# Patient Record
Sex: FEMALE | Race: WHITE | HISPANIC OR LATINO | Employment: UNEMPLOYED | URBAN - METROPOLITAN AREA
[De-identification: names, ages, dates, MRNs, and addresses within clinical notes are randomized per-mention and may not be internally consistent; named-entity substitution may affect disease eponyms.]

---

## 2017-02-09 ENCOUNTER — GENERIC CONVERSION - ENCOUNTER (OUTPATIENT)
Dept: OTHER | Facility: OTHER | Age: 32
End: 2017-02-09

## 2017-03-31 ENCOUNTER — ALLSCRIPTS OFFICE VISIT (OUTPATIENT)
Dept: OTHER | Facility: OTHER | Age: 32
End: 2017-03-31

## 2017-06-15 ENCOUNTER — ALLSCRIPTS OFFICE VISIT (OUTPATIENT)
Dept: OTHER | Facility: OTHER | Age: 32
End: 2017-06-15

## 2017-06-15 DIAGNOSIS — E61.1 IRON DEFICIENCY: ICD-10-CM

## 2017-06-16 ENCOUNTER — LAB CONVERSION - ENCOUNTER (OUTPATIENT)
Dept: OTHER | Facility: OTHER | Age: 32
End: 2017-06-16

## 2017-06-16 LAB
BASOPHILS # BLD AUTO: 0.5 %
BASOPHILS # BLD AUTO: 29 CELLS/UL (ref 0–200)
DEPRECATED RDW RBC AUTO: 14.9 % (ref 11–15)
EOSINOPHIL # BLD AUTO: 1 %
EOSINOPHIL # BLD AUTO: 58 CELLS/UL (ref 15–500)
FERRITIN SERPL-MCNC: 12 NG/ML (ref 10–154)
HBA1C MFR BLD HPLC: 4.8 % OF TOTAL HGB
HCT VFR BLD AUTO: 40.6 % (ref 35–45)
HGB BLD-MCNC: 13.3 G/DL (ref 11.7–15.5)
IRON SATN MFR SERPL: 32 % (CALC) (ref 11–50)
IRON SERPL-MCNC: 109 MCG/DL (ref 40–190)
LYMPHOCYTES # BLD AUTO: 1897 CELLS/UL (ref 850–3900)
LYMPHOCYTES # BLD AUTO: 32.7 %
MCH RBC QN AUTO: 26 PG (ref 27–33)
MCHC RBC AUTO-ENTMCNC: 32.8 G/DL (ref 32–36)
MCV RBC AUTO: 79.1 FL (ref 80–100)
MONOCYTES # BLD AUTO: 383 CELLS/UL (ref 200–950)
MONOCYTES (HISTORICAL): 6.6 %
NEUTROPHILS # BLD AUTO: 3434 CELLS/UL (ref 1500–7800)
NEUTROPHILS # BLD AUTO: 59.2 %
PLATELET # BLD AUTO: 208 THOUSAND/UL (ref 140–400)
PMV BLD AUTO: 8.1 FL (ref 7.5–12.5)
RBC # BLD AUTO: 5.13 MILLION/UL (ref 3.8–5.1)
TIBC SERPL-MCNC: 344 MCG/DL (CALC) (ref 250–450)
WBC # BLD AUTO: 5.8 THOUSAND/UL (ref 3.8–10.8)

## 2017-06-22 ENCOUNTER — GENERIC CONVERSION - ENCOUNTER (OUTPATIENT)
Dept: OTHER | Facility: OTHER | Age: 32
End: 2017-06-22

## 2017-06-23 ENCOUNTER — GENERIC CONVERSION - ENCOUNTER (OUTPATIENT)
Dept: OTHER | Facility: OTHER | Age: 32
End: 2017-06-23

## 2017-08-28 ENCOUNTER — ALLSCRIPTS OFFICE VISIT (OUTPATIENT)
Dept: OTHER | Facility: OTHER | Age: 32
End: 2017-08-28

## 2017-08-28 DIAGNOSIS — R79.0 ABNORMAL LEVEL OF BLOOD MINERAL: ICD-10-CM

## 2018-01-10 NOTE — RESULT NOTES
Dear Shelly Meraz,   Your test results have returned and are listed below:      Plan  Health Maintenance, Iron deficiency, Postgastrectomy malabsorption,  S/P laparoscopic sleeve gastrectomy    · (1) CBC/ PLT (NO DIFF); Status:Active; Requested for:[id number];    · (1) FERRITIN; Status:Active; Requested for:[id number];    · (1) IRON SATURATION %, TIBC; Status:Active; Requested  for:[id number];    · (1) IRON; Status:Active; Requested for:[id number];     Discussion/Summary  Your results show some abnormalities  2/17/16 labs reviewed  Your vitamin D is 34 which is low  Both vitamin D and calcium are important for bone health  You need to add up the amount of vitamin D that is in your multivitamin/mineral (s) and what is in your calcium citrate with D together  The total should add up to between 6934-9001 IU of vitamin D per day  You may need to add additional vitamin D3-this can be found over -the-counter  Your total calcium with all your supplements added together should equal  4878-0400 mg per day (what is in your mulitivitamin/mineral(s) and what is in your calcium citrate with D   Calcium is best absorbed in divided doses of 500 mg each  Also it is best spaced 2 hours apart from any iron  Iron and calcium taken at the same time âfightâ to be absorbed, so do not take them at the same time  Your iron stores (ferritin) is low at 7  Low iron can lead to anemia and fatigue  You should take a high potency iron supplement (like Vitron C) -65 mg -this is available over-the-counter  Start by taking one per day for 2 weeks, then increase to twice a day and then 2 more weeks try to increase to 3 per day  Stay on the highest dose you can tolerate  Iron can be very constipating so also take MIRALAX every day OR a daily stool softener  Your levels should be rechecked in 3 months  IF you CANNOT tolerate the oral iron, please let me know       Hemoglobin A1 c is 5%-this indicates good blood sugar control over 3 months and is GOOD  Please keep your regularly scheduled follow-up appointment  If you do not have a follow-up scheduled, please call the office to schedule a follow-up visit  If you have any questions, please don't hesitate to call the office  Sincerely,      Signatures   Electronically signed by : BERTHA Concepcion;  Apr 21 2016  5:20PM EST                       (Author)    Electronically signed by : JUAN MANUEL Handley ; Apr 22 2016 12:50PM EST                       (Validation)

## 2018-01-13 VITALS
WEIGHT: 170.38 LBS | DIASTOLIC BLOOD PRESSURE: 60 MMHG | HEIGHT: 67 IN | SYSTOLIC BLOOD PRESSURE: 116 MMHG | BODY MASS INDEX: 26.74 KG/M2 | TEMPERATURE: 97.7 F

## 2018-01-13 VITALS
BODY MASS INDEX: 26.13 KG/M2 | HEIGHT: 67 IN | TEMPERATURE: 98.2 F | WEIGHT: 166.5 LBS | DIASTOLIC BLOOD PRESSURE: 70 MMHG | OXYGEN SATURATION: 98 % | HEART RATE: 88 BPM | SYSTOLIC BLOOD PRESSURE: 112 MMHG | RESPIRATION RATE: 20 BRPM

## 2018-01-14 VITALS
HEIGHT: 67 IN | SYSTOLIC BLOOD PRESSURE: 120 MMHG | WEIGHT: 166.57 LBS | BODY MASS INDEX: 26.14 KG/M2 | DIASTOLIC BLOOD PRESSURE: 68 MMHG | TEMPERATURE: 97.7 F | RESPIRATION RATE: 16 BRPM | HEART RATE: 65 BPM

## 2018-01-14 NOTE — RESULT NOTES
Verified Results  (1) THYROGLOBULIN/QUANT W/ANTIBODY PANEL [id number] 01:03PM Laila Apple   Performed at:  [address]  [id number]  : Renzo Eaton MD, Phone:  [phone]     Test Name Result Flag Reference   THYROGLOB AB <1 0 IU/mL  0 0 - 0 9   Thyroglobulin Antibody measured by [facility]   THYROGLOBULIN-MC 2 1 ng/mL  1 5 - 38 5   According to the [facility] of Clinical Biochemistry, thereference interval for Thyroglobulin (TG) should be related toeuthyroid patients and not for patients who underwent thyroidectomy  TG reference intervals for these patients depend on the residualmass of the thyroid tissue left after surgery  Establishing apost-operative baseline is recommended  The assay limit of quantitation is 0 1 ng/mLThyroglobulin measured by [facility] Immunometric Assay

## 2018-01-15 NOTE — MISCELLANEOUS
Message  [facility] called with PT-1 0  Reviwed chart  Called pt, got voicemail   Left message for her to follow up with Dr Eugenie Toro, that particular lab was normal       Signatures   Electronically signed by : Maria Del Rosario Hyatt DO; Sep  3 2016  3:17PM EST                       (Author)

## 2018-01-15 NOTE — MISCELLANEOUS
Provider Comments  Provider Comments:   Dear Rhea Dowd,    We called you about your scheduled appointment for today but were unable to reach you  It is very important that you follow up with us so that we can assess your physical and nutritional safety  Please call our office at [phone] to reschedule your appointment       Sincerely,     [facility]        Signatures   Electronically signed by : Shaniqua Chavez, ; Jan 22 2016  9:49AM EST                       (Author)    Electronically signed by : JUAN MANUEL Siu ; Jan 22 2016  9:51AM EST                       (Co-author)

## 2018-01-16 NOTE — RESULT NOTES
Verified Results  (1) IRON SATURATION %, TIBC [id number] 11:31AM Makeda Jacinto     Test Name Result Flag Reference   IRON, TOTAL 109 mcg/dL     IRON BINDING CAPACITY 344 mcg/dL (calc)  250-450   % SATURATION 32 % (calc)  11-50     (1) OP MELITON FERRITIN [id number] 11:31AM Makeda Jacinto     Test Name Result Flag Reference   FERRITIN 12 ng/mL       (Q) HEMOGLOBIN A1c [id number] 11:31AM Makeda Jacinto   REPORT COMMENT:  FASTING:NO     Test Name Result Flag Reference   HEMOGLOBIN A1c 4 8 % of total Hgb  <5 7   For the purpose of screening for the presence of  diabetes:     <5 7%       Consistent with the absence of diabetes  5 7-6 4%    Consistent with increased risk for diabetes              (prediabetes)  > or =6 5%  Consistent with diabetes     This assay result is consistent with a decreased risk  of diabetes  Currently, no consensus exists regarding use of  hemoglobin A1c for diagnosis of diabetes in children  According to American Diabetes Association (ADA)  guidelines, hemoglobin A1c <7 0% represents optimal  control in non-pregnant diabetic patients  Different  metrics may apply to specific patient populations  Standards of Medical Care in Diabetes(ADA)       (1) CBC/PLT/DIFF [id number] 11:31AM Makeda Jacinto     Test Name Result Flag Reference   WHITE BLOOD CELL COUNT 5 8 Thousand/uL  3 8-10 8   RED BLOOD CELL COUNT 5 13 Million/uL H 3 80-5 10   HEMOGLOBIN 13 3 g/dL  11 7-15 5   HEMATOCRIT 40 6 %  35 0-45 0   MCV 79 1 fL L 80 0-100 0   MCH 26 0 pg L 27 0-33 0   MCHC 32 8 g/dL  32 0-36 0   RDW 14 9 %  11 0-15 0   PLATELET COUNT 429 Thousand/uL  140-400   ABSOLUTE NEUTROPHILS 3434 cells/uL  5965-3245   ABSOLUTE LYMPHOCYTES 1897 cells/uL  850-3900   ABSOLUTE MONOCYTES 383 cells/uL  200-950   ABSOLUTE EOSINOPHILS 58 cells/uL     ABSOLUTE BASOPHILS 29 cells/uL  0-200   NEUTROPHILS 59 2 %     LYMPHOCYTES 32 7 %     MONOCYTES 6 6 %     EOSINOPHILS 1 0 %     BASOPHILS 0 5 %     MPV 8 1 fL  7 5-12 5

## 2018-01-16 NOTE — MISCELLANEOUS
Provider Comments  Provider Comments:   Dear Makenna Loaiza had a scheduled appointment at our office 02/09/2017 today but were unable to keep  We attempted to call you back but were unable to reach you  It is very important that you follow up with us so that we can assess your physical and nutritional safety after your surgery  Please call our office at [phone] to reschedule your appointment       Sincerely,     [facility]            Signatures   Electronically signed by : Avril Piña [facility]; Feb 9 2017  3:41PM EST                       (Author)

## 2018-03-13 ENCOUNTER — TELEPHONE (OUTPATIENT)
Dept: BARIATRICS | Facility: CLINIC | Age: 33
End: 2018-03-13

## 2018-03-13 NOTE — TELEPHONE ENCOUNTER
----- Message from Akhil Tilley RN sent at 3/11/2018  7:11 PM EDT -----  Regarding: 3 year f/u appointment  Please call patient to schedule 3 year f/u appointment with office  Thank you

## 2018-03-15 ENCOUNTER — TELEPHONE (OUTPATIENT)
Dept: BARIATRICS | Facility: CLINIC | Age: 33
End: 2018-03-15

## 2018-03-15 NOTE — TELEPHONE ENCOUNTER
----- Message from Berna Cortes RN sent at 3/11/2018  7:11 PM EDT -----  Regarding: 3 year f/u appointment  Please call patient to schedule 3 year f/u appointment with office  Thank you

## 2018-07-10 ENCOUNTER — OFFICE VISIT (OUTPATIENT)
Dept: FAMILY MEDICINE CLINIC | Facility: CLINIC | Age: 33
End: 2018-07-10
Payer: COMMERCIAL

## 2018-07-10 VITALS
TEMPERATURE: 98.3 F | BODY MASS INDEX: 27.94 KG/M2 | WEIGHT: 178 LBS | SYSTOLIC BLOOD PRESSURE: 110 MMHG | HEART RATE: 64 BPM | RESPIRATION RATE: 18 BRPM | DIASTOLIC BLOOD PRESSURE: 68 MMHG | HEIGHT: 67 IN

## 2018-07-10 DIAGNOSIS — R35.0 URINARY FREQUENCY: Primary | ICD-10-CM

## 2018-07-10 DIAGNOSIS — R10.9 FLANK PAIN: ICD-10-CM

## 2018-07-10 DIAGNOSIS — E55.9 VITAMIN D DEFICIENCY: ICD-10-CM

## 2018-07-10 LAB
SL AMB  POCT GLUCOSE, UA: NEGATIVE
SL AMB LEUKOCYTE ESTERASE,UA: ABNORMAL
SL AMB POCT BILIRUBIN,UA: NEGATIVE
SL AMB POCT BLOOD,UA: NEGATIVE
SL AMB POCT CLARITY,UA: ABNORMAL
SL AMB POCT COLOR,UA: ABNORMAL
SL AMB POCT KETONES,UA: NEGATIVE
SL AMB POCT NITRITE,UA: NEGATIVE
SL AMB POCT PH,UA: NEGATIVE
SL AMB POCT SPECIFIC GRAVITY,UA: 1.03
SL AMB POCT URINE PROTEIN: ABNORMAL
SL AMB POCT UROBILINOGEN: NEGATIVE

## 2018-07-10 PROCEDURE — 87086 URINE CULTURE/COLONY COUNT: CPT | Performed by: NURSE PRACTITIONER

## 2018-07-10 PROCEDURE — 99213 OFFICE O/P EST LOW 20 MIN: CPT | Performed by: NURSE PRACTITIONER

## 2018-07-10 PROCEDURE — 3725F SCREEN DEPRESSION PERFORMED: CPT | Performed by: NURSE PRACTITIONER

## 2018-07-10 PROCEDURE — 81002 URINALYSIS NONAUTO W/O SCOPE: CPT | Performed by: NURSE PRACTITIONER

## 2018-07-10 PROCEDURE — 3008F BODY MASS INDEX DOCD: CPT | Performed by: NURSE PRACTITIONER

## 2018-07-10 RX ORDER — CIPROFLOXACIN 500 MG/1
500 TABLET, FILM COATED ORAL EVERY 12 HOURS SCHEDULED
Qty: 14 TABLET | Refills: 0 | Status: SHIPPED | OUTPATIENT
Start: 2018-07-10 | End: 2018-07-17

## 2018-07-10 RX ORDER — LEVOTHYROXINE SODIUM 175 UG/1
175 TABLET ORAL
COMMUNITY
Start: 2017-06-16

## 2018-07-10 NOTE — PROGRESS NOTES
Chief Complaint   Patient presents with    Back Pain     Patient present today for lower back pain for about a week including Urinary frequency and blood in the urine about 3 days ago   Headache     for the last 4 days  Assessment/Plan:     Diagnoses and all orders for this visit:    Urinary frequency  -     POCT urine dip  -     Urine culture  -     ciprofloxacin (CIPRO) 500 mg tablet; Take 1 tablet (500 mg total) by mouth every 12 (twelve) hours for 7 days    Flank pain  -     Urine culture  -     ciprofloxacin (CIPRO) 500 mg tablet; Take 1 tablet (500 mg total) by mouth every 12 (twelve) hours for 7 days    Vitamin D deficiency  -     Vitamin D 25 hydroxy; Future    Other orders  -     levothyroxine 175 mcg tablet; Take 175 mcg by mouth  -     Cholecalciferol 1000 units capsule; Take 1,000 Units by mouth          Subjective:      Patient ID: Miguelina Killian is a 29 y o  female  Patient states also dark urine  Back Pain   This is a new problem  The current episode started in the past 7 days (4-5 days ago )  The problem occurs constantly  The pain is present in the lumbar spine  The quality of the pain is described as aching  Associated symptoms include abdominal pain (cramps), dysuria, headaches and pelvic pain  Pertinent negatives include no fever  Headache    Associated symptoms include abdominal pain (cramps) and back pain  Pertinent negatives include no fever  The following portions of the patient's history were reviewed and updated as appropriate: allergies, current medications, past family history, past medical history, past social history, past surgical history and problem list     Review of Systems   Constitutional: Negative for chills, diaphoresis and fever  Respiratory: Negative for choking  Gastrointestinal: Positive for abdominal pain (cramps)  Genitourinary: Positive for dysuria, flank pain, hematuria, pelvic pain and vaginal discharge   Negative for difficulty urinating  Musculoskeletal: Positive for back pain  Neurological: Positive for headaches  Objective:      /68 (BP Location: Left arm, Patient Position: Sitting, Cuff Size: Standard)   Pulse 64   Temp 98 3 °F (36 8 °C) (Temporal)   Resp 18   Ht 5' 6 5" (1 689 m)   Wt 80 7 kg (178 lb)   LMP 06/23/2018 (Approximate)   BMI 28 30 kg/m²          Physical Exam   Constitutional: She is oriented to person, place, and time  Vital signs are normal  She appears well-developed and well-nourished  She does not appear ill  HENT:   Head: Normocephalic and atraumatic  Cardiovascular: Normal rate, regular rhythm, S1 normal, S2 normal and normal heart sounds  No murmur heard  Pulmonary/Chest: Effort normal and breath sounds normal  No respiratory distress  Abdominal: Soft  Bowel sounds are normal  She exhibits no distension  There is no tenderness  There is no CVA tenderness  Neurological: She is alert and oriented to person, place, and time

## 2018-07-10 NOTE — PATIENT INSTRUCTIONS
Urinary Tract Infection in Women   AMBULATORY CARE:   A urinary tract infection (UTI)  is caused by bacteria that get inside your urinary tract  Most bacteria that enter your urinary tract come out when you urinate  If the bacteria stay in your urinary tract, you may get an infection  Your urinary tract includes your kidneys, ureters, bladder, and urethra  Urine is made in your kidneys, and it flows from the ureters to the bladder  Urine leaves the bladder through the urethra  A UTI is more common in your lower urinary tract, which includes your bladder and urethra  Common symptoms include the following:   · Urinating more often or waking from sleep to urinate    · Pain or burning when you urinate    · Pain or pressure in your lower abdomen     · Urine that smells bad    · Blood in your urine    · Leaking urine  Seek care immediately if:   · You are urinating very little or not at all  · You have a high fever with shaking chills  · You have side or back pain that gets worse  Contact your healthcare provider if:   · You have a fever  · You do not feel better after 2 days of taking antibiotics  · You are vomiting  · You have questions or concerns about your condition or care  Treatment for a UTI  may include medicines to treat a bacterial infection  You may also need medicines to decrease pain and burning, or decrease the urge to urinate often  Prevent a UTI:   · Empty your bladder often  Urinate and empty your bladder as soon as you feel the need  Do not hold your urine for long periods of time  · Wipe from front to back after you urinate or have a bowel movement  This will help prevent germs from getting into your urinary tract through your urethra  · Drink liquids as directed  Ask how much liquid to drink each day and which liquids are best for you  You may need to drink more liquids than usual to help flush out the bacteria  Do not drink alcohol, caffeine, or citrus juices  These can irritate your bladder and increase your symptoms  Your healthcare provider may recommend cranberry juice to help prevent a UTI  · Urinate after you have sex  This can help flush out bacteria passed during sex  · Do not douche or use feminine deodorants  These can change the chemical balance in your vagina  · Change sanitary pads or tampons often  This will help prevent germs from getting into your urinary tract  · Do pelvic muscle exercises often  Pelvic muscle exercises may help you start and stop urinating  Strong pelvic muscles may help you empty your bladder easier  Squeeze these muscles tightly for 5 seconds like you are trying to hold back urine  Then relax for 5 seconds  Gradually work up to squeezing for 10 seconds  Do 3 sets of 15 repetitions a day, or as directed  Follow up with your healthcare provider as directed:  Write down your questions so you remember to ask them during your visits  © 2017 [employer] Information is for End User's use only and may not be sold, redistributed or otherwise used for commercial purposes  All illustrations and images included in CareNotes® are the copyrighted property of [employer]  or [employer]  The above information is an  only  It is not intended as medical advice for individual conditions or treatments  Talk to your doctor, nurse or pharmacist before following any medical regimen to see if it is safe and effective for you

## 2018-07-11 ENCOUNTER — TELEPHONE (OUTPATIENT)
Dept: FAMILY MEDICINE CLINIC | Facility: CLINIC | Age: 33
End: 2018-07-11

## 2018-07-11 LAB — BACTERIA UR CULT: NORMAL

## 2018-07-11 NOTE — TELEPHONE ENCOUNTER
----- Message from [address] [place] [address] sent at 7/11/2018  7:04 PM EDT -----  Urine culture showed no bacterial growth  No need to have antibiotics

## 2020-05-26 ENCOUNTER — HOSPITAL ENCOUNTER (OUTPATIENT)
Dept: RADIOLOGY | Facility: HOSPITAL | Age: 35
Discharge: HOME | End: 2020-05-26
Payer: COMMERCIAL

## 2020-05-26 ENCOUNTER — TELEPHONE (OUTPATIENT)
Dept: FAMILY MEDICINE CLINIC | Facility: CLINIC | Age: 35
End: 2020-05-26

## 2020-05-26 ENCOUNTER — OFFICE VISIT (OUTPATIENT)
Dept: FAMILY MEDICINE CLINIC | Facility: CLINIC | Age: 35
End: 2020-05-26
Payer: COMMERCIAL

## 2020-05-26 ENCOUNTER — APPOINTMENT (OUTPATIENT)
Dept: LAB | Facility: HOSPITAL | Age: 35
End: 2020-05-26
Payer: COMMERCIAL

## 2020-05-26 VITALS
WEIGHT: 188 LBS | DIASTOLIC BLOOD PRESSURE: 70 MMHG | OXYGEN SATURATION: 99 % | TEMPERATURE: 97.6 F | SYSTOLIC BLOOD PRESSURE: 122 MMHG | HEART RATE: 75 BPM | BODY MASS INDEX: 28.49 KG/M2 | HEIGHT: 68 IN

## 2020-05-26 DIAGNOSIS — E55.9 VITAMIN D DEFICIENCY: ICD-10-CM

## 2020-05-26 DIAGNOSIS — M79.89 BILATERAL HAND SWELLING: ICD-10-CM

## 2020-05-26 DIAGNOSIS — M79.641 BILATERAL HAND PAIN: ICD-10-CM

## 2020-05-26 DIAGNOSIS — M79.642 BILATERAL HAND PAIN: ICD-10-CM

## 2020-05-26 DIAGNOSIS — M79.89 BILATERAL HAND SWELLING: Primary | ICD-10-CM

## 2020-05-26 LAB
25(OH)D3 SERPL-MCNC: 20.7 NG/ML (ref 30–100)
CRP SERPL QL: <3 MG/L
ERYTHROCYTE [SEDIMENTATION RATE] IN BLOOD: 10 MM/HOUR (ref 1–20)
NT-PROBNP SERPL-MCNC: 22 PG/ML

## 2020-05-26 PROCEDURE — 3078F DIAST BP <80 MM HG: CPT | Performed by: NURSE PRACTITIONER

## 2020-05-26 PROCEDURE — 86430 RHEUMATOID FACTOR TEST QUAL: CPT

## 2020-05-26 PROCEDURE — 3074F SYST BP LT 130 MM HG: CPT | Performed by: NURSE PRACTITIONER

## 2020-05-26 PROCEDURE — 86200 CCP ANTIBODY: CPT

## 2020-05-26 PROCEDURE — 83880 ASSAY OF NATRIURETIC PEPTIDE: CPT

## 2020-05-26 PROCEDURE — 1036F TOBACCO NON-USER: CPT | Performed by: NURSE PRACTITIONER

## 2020-05-26 PROCEDURE — 99214 OFFICE O/P EST MOD 30 MIN: CPT | Performed by: NURSE PRACTITIONER

## 2020-05-26 PROCEDURE — 85652 RBC SED RATE AUTOMATED: CPT

## 2020-05-26 PROCEDURE — 82306 VITAMIN D 25 HYDROXY: CPT

## 2020-05-26 PROCEDURE — 86140 C-REACTIVE PROTEIN: CPT

## 2020-05-26 PROCEDURE — 36415 COLL VENOUS BLD VENIPUNCTURE: CPT

## 2020-05-26 PROCEDURE — 73130 X-RAY EXAM OF HAND: CPT

## 2020-05-26 PROCEDURE — 86038 ANTINUCLEAR ANTIBODIES: CPT

## 2020-05-26 PROCEDURE — 3008F BODY MASS INDEX DOCD: CPT | Performed by: NURSE PRACTITIONER

## 2020-05-27 LAB
RHEUMATOID FACT SER QL LA: NEGATIVE
RYE IGE QN: NEGATIVE

## 2020-05-28 LAB — CCP IGA+IGG SERPL IA-ACNC: 8 UNITS (ref 0–19)

## 2022-04-20 ENCOUNTER — TELEPHONE (OUTPATIENT)
Dept: FAMILY MEDICINE CLINIC | Facility: CLINIC | Age: 37
End: 2022-04-20

## 2022-04-25 ENCOUNTER — VBI (OUTPATIENT)
Dept: ADMINISTRATIVE | Facility: OTHER | Age: 37
End: 2022-04-25

## 2023-02-08 ENCOUNTER — VBI (OUTPATIENT)
Dept: ADMINISTRATIVE | Facility: OTHER | Age: 38
End: 2023-02-08

## 2023-06-15 ENCOUNTER — VBI (OUTPATIENT)
Dept: ADMINISTRATIVE | Facility: OTHER | Age: 38
End: 2023-06-15